# Patient Record
Sex: FEMALE | Race: AMERICAN INDIAN OR ALASKA NATIVE | URBAN - METROPOLITAN AREA
[De-identification: names, ages, dates, MRNs, and addresses within clinical notes are randomized per-mention and may not be internally consistent; named-entity substitution may affect disease eponyms.]

---

## 2020-12-02 ENCOUNTER — OFFICE VISIT (OUTPATIENT)
Dept: URBAN - METROPOLITAN AREA CLINIC 7 | Facility: CLINIC | Age: 65
End: 2020-12-02
Payer: COMMERCIAL

## 2020-12-02 PROCEDURE — 92134 CPTRZ OPH DX IMG PST SGM RTA: CPT | Performed by: OPHTHALMOLOGY

## 2020-12-02 PROCEDURE — 92235 FLUORESCEIN ANGRPH MLTIFRAME: CPT | Performed by: OPHTHALMOLOGY

## 2020-12-02 PROCEDURE — 92014 COMPRE OPH EXAM EST PT 1/>: CPT | Performed by: OPHTHALMOLOGY

## 2020-12-02 PROCEDURE — 92250 FUNDUS PHOTOGRAPHY W/I&R: CPT | Performed by: OPHTHALMOLOGY

## 2020-12-02 ASSESSMENT — INTRAOCULAR PRESSURE
OD: 11
OS: 13

## 2020-12-02 NOTE — IMPRESSION/PLAN
Impression: Type 2 diabetes mellitus with proliferative diabetic retinopathy without macular edema, right eye: Q70.4289. Plan: Exam/photos/OCT show quiet PDR w/o CSDME OD. FA with sweeps show staining of macular MAs without late leakage, no recurrent peripheral NV. Cont BS/BP/chol control.

## 2020-12-02 NOTE — IMPRESSION/PLAN
Impression: Puckering of macula, right eye: H35.371. Plan: Exam/OCT show a moderate ERM. Observe at this time.

## 2020-12-02 NOTE — IMPRESSION/PLAN
Impression: Type 2 diabetes mellitus with proliferative diabetic retinopathy with macular edema, left eye: C06.5754. Plan: Exam/photos/OCT show quiet PDR s/p PRP, non-central CSDME OS. FA with sweeps show staining of macular MAs with leakage, peripheral ischemia without recurrent NV. Recommend focal grid laser OS. 1-2 weeks, focal laser OS (use today's Ze[employer] OCT)

## 2020-12-23 ENCOUNTER — PROCEDURE (OUTPATIENT)
Dept: URBAN - METROPOLITAN AREA CLINIC 7 | Facility: CLINIC | Age: 65
End: 2020-12-23
Payer: COMMERCIAL

## 2020-12-23 PROCEDURE — 67210 TREATMENT OF RETINAL LESION: CPT | Performed by: OPHTHALMOLOGY

## 2020-12-23 ASSESSMENT — INTRAOCULAR PRESSURE
OS: 10
OD: 10

## 2021-02-03 ENCOUNTER — OFFICE VISIT (OUTPATIENT)
Dept: URBAN - METROPOLITAN AREA CLINIC 7 | Facility: CLINIC | Age: 66
End: 2021-02-03
Payer: COMMERCIAL

## 2021-02-03 DIAGNOSIS — E11.3591 TYPE 2 DIABETES MELLITUS WITH PROLIFERATIVE DIABETIC RETINOPATHY WITHOUT MACULAR EDEMA, RIGHT EYE: ICD-10-CM

## 2021-02-03 PROCEDURE — 99024 POSTOP FOLLOW-UP VISIT: CPT | Performed by: OPHTHALMOLOGY

## 2021-02-03 PROCEDURE — 92134 CPTRZ OPH DX IMG PST SGM RTA: CPT | Performed by: OPHTHALMOLOGY

## 2021-02-03 PROCEDURE — 99213 OFFICE O/P EST LOW 20 MIN: CPT | Performed by: OPHTHALMOLOGY

## 2021-02-03 ASSESSMENT — INTRAOCULAR PRESSURE
OD: 12
OS: 10

## 2021-02-03 NOTE — IMPRESSION/PLAN
Impression: Type 2 diabetes mellitus with proliferative diabetic retinopathy with macular edema, left eye: U78.1867.
s/p focal laser 12/23/20 Plan: Exam/OCT show quiet PDR s/p PRP, improving non-central CSDME OS. FA sweeps 12/2/20 showed staining of macular MAs with leakage, peripheral ischemia without recurrent NV. Discussed options of observation, anti-VEGF, steroids, and laser; recommend observation at this time. 

2 months, OCT OU, reeval

## 2021-02-03 NOTE — IMPRESSION/PLAN
Impression: Type 2 diabetes mellitus with proliferative diabetic retinopathy without macular edema, right eye: P37.7301. Plan: Exam/OCT show quiet PDR w/o CSDME OD. FA sweeps 12/2/20 showed staining of macular MAs without late leakage, no recurrent peripheral NV. Monitor for progression. Cont BS/BP/chol control.

## 2021-04-07 ENCOUNTER — OFFICE VISIT (OUTPATIENT)
Dept: URBAN - METROPOLITAN AREA CLINIC 7 | Facility: CLINIC | Age: 66
End: 2021-04-07
Payer: COMMERCIAL

## 2021-04-07 PROCEDURE — 99213 OFFICE O/P EST LOW 20 MIN: CPT | Performed by: OPHTHALMOLOGY

## 2021-04-07 PROCEDURE — 92134 CPTRZ OPH DX IMG PST SGM RTA: CPT | Performed by: OPHTHALMOLOGY

## 2021-04-07 ASSESSMENT — INTRAOCULAR PRESSURE
OD: 10
OS: 8

## 2021-04-07 NOTE — IMPRESSION/PLAN
Impression: ERM OD Plan: Exam/OCT show a moderate ERM. Reviewed options of observation vs PPV/MP, recommend observation at this time.

## 2021-04-07 NOTE — IMPRESSION/PLAN
Impression: PDR w/o DME OD Plan: Exam/OCT show quiet PDR w/o CSDME OD. FA sweeps 12/2/20 showed staining of macular MAs without late leakage, no recurrent peripheral NV. Monitor for progression. Cont BS/BP/chol control.

## 2021-04-07 NOTE — IMPRESSION/PLAN
Impression: PDR w DME OS
s/p focal laser 12/23/20 Plan: Exam/OCT show quiet PDR s/p PRP, improving non-central CSDME OS. FA sweeps 12/2/20 showed staining of macular MAs with leakage, peripheral ischemia without recurrent NV. Discussed options of observation, anti-VEGF, steroids, and laser; recommend observation at this time. 

3 months, colors/FA sweeps/OCT OU, reeval

## 2021-07-29 ENCOUNTER — OFFICE VISIT (OUTPATIENT)
Dept: URBAN - METROPOLITAN AREA CLINIC 7 | Facility: CLINIC | Age: 66
End: 2021-07-29
Payer: OTHER GOVERNMENT

## 2021-07-29 DIAGNOSIS — E11.3512 TYPE 2 DIABETES MELLITUS WITH PROLIFERATIVE DIABETIC RETINOPATHY WITH MACULAR EDEMA, LEFT EYE: Primary | ICD-10-CM

## 2021-07-29 DIAGNOSIS — H25.13 AGE-RELATED NUCLEAR CATARACT, BILATERAL: ICD-10-CM

## 2021-07-29 PROCEDURE — 92134 CPTRZ OPH DX IMG PST SGM RTA: CPT | Performed by: OPHTHALMOLOGY

## 2021-07-29 PROCEDURE — 99214 OFFICE O/P EST MOD 30 MIN: CPT | Performed by: OPHTHALMOLOGY

## 2021-07-29 PROCEDURE — 92235 FLUORESCEIN ANGRPH MLTIFRAME: CPT | Performed by: OPHTHALMOLOGY

## 2021-07-29 ASSESSMENT — INTRAOCULAR PRESSURE
OD: 11
OS: 9

## 2021-07-29 NOTE — IMPRESSION/PLAN
Impression: PDR w DME OS
s/p focal laser 12/23/20 Plan: Exam/photos/OCT show quiet PDR s/p PRP, improving non-central CSDME OS. FA sweeps 7/29/21 show angiographic leakage, peripheral ischemia without recurrent NV. Discussed options of observation, anti-VEGF, steroids, and laser; recommend observation at this time. 

3 months, OCT OU, reeval

## 2021-07-29 NOTE — IMPRESSION/PLAN
Impression: PDR w/o DME OD Plan: Exam/photos/OCT show quiet PDR w/o CSDME OD. FA sweeps 7/29/21 show staining of macular MAs without late leakage, no recurrent peripheral NV. Monitor for progression. Cont BS/BP/chol control.

## 2021-10-21 ENCOUNTER — OFFICE VISIT (OUTPATIENT)
Dept: URBAN - METROPOLITAN AREA CLINIC 7 | Facility: CLINIC | Age: 66
End: 2021-10-21
Payer: OTHER GOVERNMENT

## 2021-10-21 PROCEDURE — 92134 CPTRZ OPH DX IMG PST SGM RTA: CPT | Performed by: OPHTHALMOLOGY

## 2021-10-21 PROCEDURE — 99213 OFFICE O/P EST LOW 20 MIN: CPT | Performed by: OPHTHALMOLOGY

## 2021-10-21 ASSESSMENT — INTRAOCULAR PRESSURE
OS: 12
OD: 11

## 2021-10-21 NOTE — IMPRESSION/PLAN
Impression: PDR w DME OS
s/p focal laser 12/23/20 Plan: Exam/OCT show quiet PDR s/p PRP, improving non-central CSDME OS. FA sweeps 7/29/21 showed angiographic leakage, peripheral ischemia without recurrent NV. Discussed options of observation, anti-VEGF, steroids, and laser; recommend observation at this time. 

3 months, photos/OCT OU, reeval

## 2021-10-21 NOTE — IMPRESSION/PLAN
Impression: PDR w/o DME OD Plan: Exam/OCT show quiet PDR w/o CSDME OD. FA sweeps 7/29/21 showed staining of macular MAs without late leakage, no recurrent peripheral NV. Monitor for progression. Last A1c 8.0. Cont BS/BP/chol control.

## 2022-02-10 ENCOUNTER — OFFICE VISIT (OUTPATIENT)
Dept: URBAN - METROPOLITAN AREA CLINIC 7 | Facility: CLINIC | Age: 67
End: 2022-02-10
Payer: OTHER GOVERNMENT

## 2022-02-10 DIAGNOSIS — H35.371 PUCKERING OF MACULA, RIGHT EYE: ICD-10-CM

## 2022-02-10 PROCEDURE — 92014 COMPRE OPH EXAM EST PT 1/>: CPT | Performed by: OPHTHALMOLOGY

## 2022-02-10 PROCEDURE — 92250 FUNDUS PHOTOGRAPHY W/I&R: CPT | Performed by: OPHTHALMOLOGY

## 2022-02-10 PROCEDURE — 92134 CPTRZ OPH DX IMG PST SGM RTA: CPT | Performed by: OPHTHALMOLOGY

## 2022-02-10 ASSESSMENT — INTRAOCULAR PRESSURE
OS: 14
OD: 12

## 2022-02-10 NOTE — IMPRESSION/PLAN
Impression: PDR w DME OS
 - s/p focal laser 12/23/20 Plan: Exam/OCT show quiet PDR s/p PRP, improving non-central CSDME OS. Photos 2/10/22 showed quiet PDR. FA sweeps 7/29/21 showed angiographic leakage, peripheral ischemia without recurrent NV. Discussed options of observation, anti-VEGF, steroids, and laser; recommend observation at this time. 

3 months, OCT OU, reeval

## 2022-02-10 NOTE — IMPRESSION/PLAN
Impression: PDR w/o DME OD Plan: Exam/OCT show quiet PDR w/o CSDME OD. Photos 2/10/22 showed quiet PDR. FA sweeps 7/29/21 showed staining of macular MAs without late leakage, no recurrent peripheral NV. Monitor for progression. Last A1c 7.5. Cont BS/BP/chol control.

## 2022-05-19 ENCOUNTER — OFFICE VISIT (OUTPATIENT)
Dept: URBAN - METROPOLITAN AREA CLINIC 7 | Facility: CLINIC | Age: 67
End: 2022-05-19
Payer: MEDICARE

## 2022-05-19 DIAGNOSIS — E11.3512 TYPE 2 DIABETES MELLITUS WITH PROLIFERATIVE DIABETIC RETINOPATHY WITH MACULAR EDEMA, LEFT EYE: Primary | ICD-10-CM

## 2022-05-19 DIAGNOSIS — E11.3591 TYPE 2 DIABETES MELLITUS WITH PROLIFERATIVE DIABETIC RETINOPATHY WITHOUT MACULAR EDEMA, RIGHT EYE: ICD-10-CM

## 2022-05-19 PROCEDURE — 99213 OFFICE O/P EST LOW 20 MIN: CPT | Performed by: OPHTHALMOLOGY

## 2022-05-19 PROCEDURE — 92134 CPTRZ OPH DX IMG PST SGM RTA: CPT | Performed by: OPHTHALMOLOGY

## 2022-05-19 ASSESSMENT — INTRAOCULAR PRESSURE
OS: 15
OD: 13

## 2022-05-19 NOTE — IMPRESSION/PLAN
Impression: PDR w DME OS
 - s/p focal laser 12/23/20 Plan: Exam/OCT show quiet PDR s/p PRP, stable non-central CSDME OS. Photos 2/10/22 showed quiet PDR. FA sweeps 7/29/21 showed angiographic leakage, peripheral ischemia without recurrent NV. Discussed options of observation, anti-VEGF, steroids, and laser; recommend observation at this time. 

3 months, colors/FA sweeps/OCT OU, reeval

## 2022-05-19 NOTE — IMPRESSION/PLAN
Impression: PDR w/o DME OD Plan: Exam/OCT show quiet PDR w/o CSDME OD. Photos 2/10/22 showed quiet PDR. FA sweeps 7/29/21 showed staining of macular MAs without late leakage, no recurrent peripheral NV. Monitor for progression. Last A1c 8. Cont BS/BP/chol control.

## 2022-08-24 ENCOUNTER — OFFICE VISIT (OUTPATIENT)
Dept: URBAN - METROPOLITAN AREA CLINIC 7 | Facility: CLINIC | Age: 67
End: 2022-08-24
Payer: MEDICARE

## 2022-08-24 DIAGNOSIS — E11.3513 TYPE 2 DIABETES MELLITUS WITH PROLIFERATIVE DIABETIC RETINOPATHY WITH MACULAR EDEMA, BILATERAL: Primary | ICD-10-CM

## 2022-08-24 DIAGNOSIS — H25.13 AGE-RELATED NUCLEAR CATARACT, BILATERAL: ICD-10-CM

## 2022-08-24 DIAGNOSIS — E11.3512 TYPE 2 DIABETES MELLITUS WITH PROLIFERATIVE DIABETIC RETINOPATHY WITH MACULAR EDEMA, LEFT EYE: ICD-10-CM

## 2022-08-24 DIAGNOSIS — H35.371 PUCKERING OF MACULA, RIGHT EYE: ICD-10-CM

## 2022-08-24 PROCEDURE — 92250 FUNDUS PHOTOGRAPHY W/I&R: CPT | Performed by: OPHTHALMOLOGY

## 2022-08-24 PROCEDURE — 99214 OFFICE O/P EST MOD 30 MIN: CPT | Performed by: OPHTHALMOLOGY

## 2022-08-24 PROCEDURE — 92235 FLUORESCEIN ANGRPH MLTIFRAME: CPT | Performed by: OPHTHALMOLOGY

## 2022-08-24 PROCEDURE — 92134 CPTRZ OPH DX IMG PST SGM RTA: CPT | Performed by: OPHTHALMOLOGY

## 2022-08-24 ASSESSMENT — INTRAOCULAR PRESSURE
OD: 13
OS: 11

## 2022-08-24 NOTE — IMPRESSION/PLAN
Impression: PDR with macular edema OU
 - s/p focal laser OS 12/23/20 Plan: Exam/OCT show quiet PDR with mild non-central DME OU. Photos 8/24/22 showed quiet PDR. FA sweeps 8/24/22 showed angiographic leakage, peripheral ischemia without recurrent NV. Discussed options of observation, anti-VEGF, steroids, and laser; recommend observation at this time. Cont BS/BP/chol control. 

3 months, DFE/OCT OU, reeval

## 2022-12-07 ENCOUNTER — OFFICE VISIT (OUTPATIENT)
Dept: URBAN - METROPOLITAN AREA CLINIC 7 | Facility: CLINIC | Age: 67
End: 2022-12-07
Payer: MEDICARE

## 2022-12-07 DIAGNOSIS — E11.3513 TYPE 2 DIABETES MELLITUS WITH PROLIFERATIVE DIABETIC RETINOPATHY WITH MACULAR EDEMA, BILATERAL: Primary | ICD-10-CM

## 2022-12-07 PROCEDURE — 99213 OFFICE O/P EST LOW 20 MIN: CPT | Performed by: OPHTHALMOLOGY

## 2022-12-07 PROCEDURE — 92134 CPTRZ OPH DX IMG PST SGM RTA: CPT | Performed by: OPHTHALMOLOGY

## 2022-12-07 ASSESSMENT — INTRAOCULAR PRESSURE
OD: 11
OS: 10

## 2022-12-07 NOTE — IMPRESSION/PLAN
Impression: PDR with macular edema OU
 - s/p focal laser OS 12/23/20 Plan: Exam/OCT show quiet PDR with mild non-central DME OU. Photos 8/24/22 showed quiet PDR. FA sweeps 8/24/22 showed angiographic leakage, peripheral ischemia without recurrent NV. Discussed options of observation, anti-VEGF, steroids, and laser; recommend observation at this time. Last A1c 7.4, cont BS/BP/chol control. 

3 months, DFE/OCT OU, reeval

## 2023-03-01 ENCOUNTER — OFFICE VISIT (OUTPATIENT)
Dept: URBAN - METROPOLITAN AREA CLINIC 7 | Facility: CLINIC | Age: 68
End: 2023-03-01
Payer: MEDICARE

## 2023-03-01 DIAGNOSIS — E11.3513 TYPE 2 DIABETES MELLITUS WITH PROLIFERATIVE DIABETIC RETINOPATHY WITH MACULAR EDEMA, BILATERAL: Primary | ICD-10-CM

## 2023-03-01 DIAGNOSIS — H35.371 PUCKERING OF MACULA, RIGHT EYE: ICD-10-CM

## 2023-03-01 DIAGNOSIS — H25.13 AGE-RELATED NUCLEAR CATARACT, BILATERAL: ICD-10-CM

## 2023-03-01 PROCEDURE — 99214 OFFICE O/P EST MOD 30 MIN: CPT | Performed by: OPHTHALMOLOGY

## 2023-03-01 PROCEDURE — 92134 CPTRZ OPH DX IMG PST SGM RTA: CPT | Performed by: OPHTHALMOLOGY

## 2023-03-01 ASSESSMENT — INTRAOCULAR PRESSURE
OD: 14
OS: 12

## 2023-03-01 NOTE — IMPRESSION/PLAN
Impression: PDR with macular edema OU
 - s/p focal laser OS 12/23/20 Plan: Exam/OCT show quiet PDR with mild non-central DME OD, worsening center-involved CSDME OS. Photos 8/24/22 showed quiet PDR. FA sweeps 8/24/22 showed angiographic leakage, peripheral ischemia without recurrent NV. Discussed options of observation, anti-VEGF, steroids, and laser; pt elects observation at this time. Last A1c 8, cont BS/BP/chol control. 

2 months, photos/FA sweeps/OCT OU, reeval  ([facility])

## 2023-04-26 ENCOUNTER — OFFICE VISIT (OUTPATIENT)
Dept: URBAN - METROPOLITAN AREA CLINIC 7 | Facility: CLINIC | Age: 68
End: 2023-04-26
Payer: MEDICARE

## 2023-04-26 DIAGNOSIS — E11.3513 TYPE 2 DIABETES MELLITUS WITH PROLIFERATIVE DIABETIC RETINOPATHY WITH MACULAR EDEMA, BILATERAL: Primary | ICD-10-CM

## 2023-04-26 PROCEDURE — 92250 FUNDUS PHOTOGRAPHY W/I&R: CPT | Performed by: OPHTHALMOLOGY

## 2023-04-26 PROCEDURE — 99213 OFFICE O/P EST LOW 20 MIN: CPT | Performed by: OPHTHALMOLOGY

## 2023-04-26 PROCEDURE — 92235 FLUORESCEIN ANGRPH MLTIFRAME: CPT | Performed by: OPHTHALMOLOGY

## 2023-04-26 PROCEDURE — 92134 CPTRZ OPH DX IMG PST SGM RTA: CPT | Performed by: OPHTHALMOLOGY

## 2023-04-26 ASSESSMENT — INTRAOCULAR PRESSURE
OD: 10
OS: 10

## 2023-04-26 NOTE — IMPRESSION/PLAN
Impression: PDR with macular edema OU
 - s/p focal laser OS 12/23/20 Plan: Exam/OCT show quiet PDR with mild non-central DME OD, persistent center-involved CSDME OS with good VA. Photos 4/26/232 showed quiet PDR. FA sweeps 4/26/23 showed angiographic leakage, peripheral ischemia without recurrent NV. Discussed options of observation, anti-VEGF, steroids, and laser; pt elects observation at this time. Last A1c 8, cont BS/BP/chol control. 

3 months, DFE/OCT OU, reeval  ([facility])

## 2023-08-14 ENCOUNTER — OFFICE VISIT (OUTPATIENT)
Dept: URBAN - METROPOLITAN AREA CLINIC 7 | Facility: CLINIC | Age: 68
End: 2023-08-14
Payer: MEDICARE

## 2023-08-14 DIAGNOSIS — H25.13 AGE-RELATED NUCLEAR CATARACT, BILATERAL: ICD-10-CM

## 2023-08-14 DIAGNOSIS — H35.371 PUCKERING OF MACULA, RIGHT EYE: ICD-10-CM

## 2023-08-14 DIAGNOSIS — E11.3513 TYPE 2 DIABETES MELLITUS WITH PROLIFERATIVE DIABETIC RETINOPATHY WITH MACULAR EDEMA, BILATERAL: Primary | ICD-10-CM

## 2023-08-14 PROCEDURE — 92134 CPTRZ OPH DX IMG PST SGM RTA: CPT | Performed by: OPHTHALMOLOGY

## 2023-08-14 PROCEDURE — 67028 INJECTION EYE DRUG: CPT | Performed by: OPHTHALMOLOGY

## 2023-08-14 PROCEDURE — 99214 OFFICE O/P EST MOD 30 MIN: CPT | Performed by: OPHTHALMOLOGY

## 2023-08-14 ASSESSMENT — INTRAOCULAR PRESSURE
OS: 9
OD: 6

## 2023-11-13 ENCOUNTER — OFFICE VISIT (OUTPATIENT)
Dept: URBAN - METROPOLITAN AREA CLINIC 7 | Facility: CLINIC | Age: 68
End: 2023-11-13
Payer: MEDICARE

## 2023-11-13 DIAGNOSIS — H35.371 PUCKERING OF MACULA, RIGHT EYE: ICD-10-CM

## 2023-11-13 DIAGNOSIS — H25.13 AGE-RELATED NUCLEAR CATARACT, BILATERAL: ICD-10-CM

## 2023-11-13 DIAGNOSIS — E11.3513 TYPE 2 DIABETES MELLITUS WITH PROLIFERATIVE DIABETIC RETINOPATHY WITH MACULAR EDEMA, BILATERAL: Primary | ICD-10-CM

## 2023-11-13 PROCEDURE — 67028 INJECTION EYE DRUG: CPT | Performed by: OPHTHALMOLOGY

## 2023-11-13 PROCEDURE — 92134 CPTRZ OPH DX IMG PST SGM RTA: CPT | Performed by: OPHTHALMOLOGY

## 2023-11-13 PROCEDURE — 99213 OFFICE O/P EST LOW 20 MIN: CPT | Performed by: OPHTHALMOLOGY

## 2023-11-13 ASSESSMENT — INTRAOCULAR PRESSURE
OS: 14
OD: 14

## 2024-03-11 ENCOUNTER — OFFICE VISIT (OUTPATIENT)
Dept: URBAN - METROPOLITAN AREA CLINIC 7 | Facility: CLINIC | Age: 69
End: 2024-03-11
Payer: MEDICARE

## 2024-03-11 DIAGNOSIS — H25.13 AGE-RELATED NUCLEAR CATARACT, BILATERAL: ICD-10-CM

## 2024-03-11 DIAGNOSIS — E11.3513 TYPE 2 DIABETES MELLITUS WITH PROLIFERATIVE DIABETIC RETINOPATHY WITH MACULAR EDEMA, BILATERAL: Primary | ICD-10-CM

## 2024-03-11 DIAGNOSIS — H35.371 PUCKERING OF MACULA, RIGHT EYE: ICD-10-CM

## 2024-03-11 PROCEDURE — 99214 OFFICE O/P EST MOD 30 MIN: CPT | Performed by: OPHTHALMOLOGY

## 2024-03-11 PROCEDURE — 92134 CPTRZ OPH DX IMG PST SGM RTA: CPT | Performed by: OPHTHALMOLOGY

## 2024-03-11 ASSESSMENT — INTRAOCULAR PRESSURE
OD: 10
OS: 11

## 2024-04-22 ENCOUNTER — OFFICE VISIT (OUTPATIENT)
Dept: URBAN - METROPOLITAN AREA CLINIC 7 | Facility: CLINIC | Age: 69
End: 2024-04-22
Payer: MEDICARE

## 2024-04-22 DIAGNOSIS — E11.3513 TYPE 2 DIABETES MELLITUS WITH PROLIFERATIVE DIABETIC RETINOPATHY WITH MACULAR EDEMA, BILATERAL: Primary | ICD-10-CM

## 2024-04-22 DIAGNOSIS — H25.13 AGE-RELATED NUCLEAR CATARACT, BILATERAL: ICD-10-CM

## 2024-04-22 DIAGNOSIS — H35.371 PUCKERING OF MACULA, RIGHT EYE: ICD-10-CM

## 2024-04-22 PROCEDURE — 92134 CPTRZ OPH DX IMG PST SGM RTA: CPT | Performed by: OPHTHALMOLOGY

## 2024-04-22 ASSESSMENT — INTRAOCULAR PRESSURE
OD: 6
OS: 7

## 2024-06-03 ENCOUNTER — OFFICE VISIT (OUTPATIENT)
Dept: URBAN - METROPOLITAN AREA CLINIC 7 | Facility: CLINIC | Age: 69
End: 2024-06-03
Payer: MEDICARE

## 2024-06-03 DIAGNOSIS — E11.3513 TYPE 2 DIABETES MELLITUS WITH PROLIFERATIVE DIABETIC RETINOPATHY WITH MACULAR EDEMA, BILATERAL: Primary | ICD-10-CM

## 2024-06-03 PROCEDURE — 92134 CPTRZ OPH DX IMG PST SGM RTA: CPT | Performed by: OPHTHALMOLOGY

## 2024-06-03 ASSESSMENT — INTRAOCULAR PRESSURE
OS: 11
OD: 12

## 2024-07-15 ENCOUNTER — OFFICE VISIT (OUTPATIENT)
Dept: URBAN - METROPOLITAN AREA CLINIC 7 | Facility: CLINIC | Age: 69
End: 2024-07-15
Payer: MEDICARE

## 2024-07-15 DIAGNOSIS — H35.371 PUCKERING OF MACULA, RIGHT EYE: ICD-10-CM

## 2024-07-15 DIAGNOSIS — E11.3513 TYPE 2 DIABETES MELLITUS WITH PROLIFERATIVE DIABETIC RETINOPATHY WITH MACULAR EDEMA, BILATERAL: Primary | ICD-10-CM

## 2024-07-15 DIAGNOSIS — H25.13 AGE-RELATED NUCLEAR CATARACT, BILATERAL: ICD-10-CM

## 2024-07-15 PROCEDURE — 92134 CPTRZ OPH DX IMG PST SGM RTA: CPT | Performed by: OPHTHALMOLOGY

## 2024-07-15 PROCEDURE — 67028 INJECTION EYE DRUG: CPT | Performed by: OPHTHALMOLOGY

## 2024-07-15 ASSESSMENT — INTRAOCULAR PRESSURE
OD: 19
OS: 17

## 2024-09-13 ENCOUNTER — OFFICE VISIT (OUTPATIENT)
Dept: URBAN - METROPOLITAN AREA CLINIC 7 | Facility: CLINIC | Age: 69
End: 2024-09-13
Payer: MEDICARE

## 2024-09-13 DIAGNOSIS — E11.3513 TYPE 2 DIABETES MELLITUS WITH PROLIFERATIVE DIABETIC RETINOPATHY WITH MACULAR EDEMA, BILATERAL: Primary | ICD-10-CM

## 2024-09-13 DIAGNOSIS — H35.371 PUCKERING OF MACULA, RIGHT EYE: ICD-10-CM

## 2024-09-13 DIAGNOSIS — H25.13 AGE-RELATED NUCLEAR CATARACT, BILATERAL: ICD-10-CM

## 2024-09-13 PROCEDURE — 99214 OFFICE O/P EST MOD 30 MIN: CPT | Performed by: OPHTHALMOLOGY

## 2024-09-13 PROCEDURE — 67028 INJECTION EYE DRUG: CPT | Performed by: OPHTHALMOLOGY

## 2024-09-13 PROCEDURE — 92134 CPTRZ OPH DX IMG PST SGM RTA: CPT | Performed by: OPHTHALMOLOGY

## 2024-09-13 ASSESSMENT — INTRAOCULAR PRESSURE
OD: 9
OS: 9

## 2024-12-20 ENCOUNTER — OFFICE VISIT (OUTPATIENT)
Dept: URBAN - METROPOLITAN AREA CLINIC 7 | Facility: CLINIC | Age: 69
End: 2024-12-20
Payer: MEDICARE

## 2024-12-20 DIAGNOSIS — H35.371 PUCKERING OF MACULA, RIGHT EYE: ICD-10-CM

## 2024-12-20 DIAGNOSIS — H25.13 AGE-RELATED NUCLEAR CATARACT, BILATERAL: ICD-10-CM

## 2024-12-20 DIAGNOSIS — E11.3513 TYPE 2 DIABETES MELLITUS WITH PROLIFERATIVE DIABETIC RETINOPATHY WITH MACULAR EDEMA, BILATERAL: Primary | ICD-10-CM

## 2024-12-20 PROCEDURE — 92134 CPTRZ OPH DX IMG PST SGM RTA: CPT | Performed by: OPHTHALMOLOGY

## 2024-12-20 PROCEDURE — 92235 FLUORESCEIN ANGRPH MLTIFRAME: CPT | Performed by: OPHTHALMOLOGY

## 2024-12-20 PROCEDURE — 67028 INJECTION EYE DRUG: CPT | Performed by: OPHTHALMOLOGY

## 2024-12-20 PROCEDURE — 99213 OFFICE O/P EST LOW 20 MIN: CPT | Performed by: OPHTHALMOLOGY

## 2024-12-20 ASSESSMENT — INTRAOCULAR PRESSURE
OD: 10
OS: 12

## 2025-06-20 ENCOUNTER — OFFICE VISIT (OUTPATIENT)
Dept: URBAN - METROPOLITAN AREA CLINIC 7 | Facility: CLINIC | Age: 70
End: 2025-06-20
Payer: MEDICARE

## 2025-06-20 DIAGNOSIS — E11.3513 TYPE 2 DIABETES MELLITUS WITH PROLIFERATIVE DIABETIC RETINOPATHY WITH MACULAR EDEMA, BILATERAL: Primary | ICD-10-CM

## 2025-06-20 DIAGNOSIS — H25.13 AGE-RELATED NUCLEAR CATARACT, BILATERAL: ICD-10-CM

## 2025-06-20 DIAGNOSIS — H35.371 PUCKERING OF MACULA, RIGHT EYE: ICD-10-CM

## 2025-06-20 PROCEDURE — 99213 OFFICE O/P EST LOW 20 MIN: CPT | Performed by: OPHTHALMOLOGY

## 2025-06-20 PROCEDURE — 92134 CPTRZ OPH DX IMG PST SGM RTA: CPT | Performed by: OPHTHALMOLOGY

## 2025-06-20 ASSESSMENT — INTRAOCULAR PRESSURE
OS: 12
OD: 13